# Patient Record
Sex: FEMALE | Race: WHITE | NOT HISPANIC OR LATINO
[De-identification: names, ages, dates, MRNs, and addresses within clinical notes are randomized per-mention and may not be internally consistent; named-entity substitution may affect disease eponyms.]

---

## 2023-08-15 ENCOUNTER — APPOINTMENT (OUTPATIENT)
Dept: OPHTHALMOLOGY | Facility: CLINIC | Age: 73
End: 2023-08-15
Payer: MEDICARE

## 2023-08-15 ENCOUNTER — NON-APPOINTMENT (OUTPATIENT)
Age: 73
End: 2023-08-15

## 2023-08-15 PROCEDURE — 92025 CPTRIZED CORNEAL TOPOGRAPHY: CPT

## 2023-08-15 PROCEDURE — 92136 OPHTHALMIC BIOMETRY: CPT

## 2023-08-15 PROCEDURE — 92004 COMPRE OPH EXAM NEW PT 1/>: CPT

## 2023-08-15 PROCEDURE — 92250 FUNDUS PHOTOGRAPHY W/I&R: CPT

## 2023-08-29 ENCOUNTER — TRANSCRIPTION ENCOUNTER (OUTPATIENT)
Age: 73
End: 2023-08-29

## 2024-01-19 RX ORDER — ACETAMINOPHEN 500 MG
650 TABLET ORAL EVERY 6 HOURS
Refills: 0 | Status: DISCONTINUED | OUTPATIENT
Start: 2024-01-22 | End: 2024-01-22

## 2024-01-19 RX ORDER — SODIUM CHLORIDE 9 MG/ML
1000 INJECTION, SOLUTION INTRAVENOUS
Refills: 0 | Status: DISCONTINUED | OUTPATIENT
Start: 2024-01-22 | End: 2024-01-22

## 2024-01-19 NOTE — ASU PATIENT PROFILE, ADULT - NSICDXPASTSURGICALHX_GEN_ALL_CORE_FT
PAST SURGICAL HISTORY:  No significant past surgical history PAST SURGICAL HISTORY:  H/O  section X1    H/O knee surgery right knee meniscus repair    History of surgery cardiac ablation twice ,      History of surgery right hand fracture repair

## 2024-01-19 NOTE — ASU PATIENT PROFILE, ADULT - NS PREOP UNDERSTANDS INFO
Spoke to patient to be Npo/NO solid foods after 2300 pm on Sunday night , allow to drink water till   2 - am , dress comfortable, leave all valuable at home , Bring ID photo and insurance cards,  escort arranged, address and telephone given to patient/yes

## 2024-01-19 NOTE — ASU PATIENT PROFILE, ADULT - NS PRO PASSIVE SMOKE EXP
Results   LIPID PNL   06 Sep 2017 07:19 AM  -   FASTING STATUS: 12 hrs  -   CHOLESTEROL: 163  Reference Range: <200  -   HDL CHOLESTEROL: 50  Reference Range: >39  -   TRIGLYCERIDES: 131  Reference Range: <150  -   LDL CHOLESTEROL (CALCULATED): 87  Reference Range: <130  -   NON-HDL CHOLESTEROL: 113 mg/dl  -   CHOLESTEROL/HDL RATIO: 3.3   Reference Range: <4.5.  Discussed   Looks good.   No

## 2024-01-19 NOTE — ASU PATIENT PROFILE, ADULT - FALL HARM RISK - HARM RISK INTERVENTIONS

## 2024-01-19 NOTE — ASU PATIENT PROFILE, ADULT - NSICDXPASTMEDICALHX_GEN_ALL_CORE_FT
PAST MEDICAL HISTORY:  Atrial fibrillation     GERD (gastroesophageal reflux disease)     Hypertension      PAST MEDICAL HISTORY:  Atrial fibrillation     GERD (gastroesophageal reflux disease)

## 2024-01-22 ENCOUNTER — APPOINTMENT (OUTPATIENT)
Dept: OPHTHALMOLOGY | Facility: AMBULATORY SURGERY CENTER | Age: 74
End: 2024-01-22

## 2024-01-22 ENCOUNTER — TRANSCRIPTION ENCOUNTER (OUTPATIENT)
Age: 74
End: 2024-01-22

## 2024-01-22 ENCOUNTER — OUTPATIENT (OUTPATIENT)
Dept: OUTPATIENT SERVICES | Facility: HOSPITAL | Age: 74
LOS: 1 days | Discharge: HOME | End: 2024-01-22
Payer: MEDICARE

## 2024-01-22 VITALS
DIASTOLIC BLOOD PRESSURE: 56 MMHG | TEMPERATURE: 98 F | WEIGHT: 129.63 LBS | RESPIRATION RATE: 16 BRPM | OXYGEN SATURATION: 96 % | HEART RATE: 72 BPM | SYSTOLIC BLOOD PRESSURE: 115 MMHG | HEIGHT: 65 IN

## 2024-01-22 VITALS
RESPIRATION RATE: 16 BRPM | DIASTOLIC BLOOD PRESSURE: 46 MMHG | SYSTOLIC BLOOD PRESSURE: 100 MMHG | HEART RATE: 73 BPM | OXYGEN SATURATION: 98 % | TEMPERATURE: 99 F

## 2024-01-22 DIAGNOSIS — Z98.890 OTHER SPECIFIED POSTPROCEDURAL STATES: Chronic | ICD-10-CM

## 2024-01-22 DIAGNOSIS — Z98.891 HISTORY OF UTERINE SCAR FROM PREVIOUS SURGERY: Chronic | ICD-10-CM

## 2024-01-22 PROCEDURE — 6698F: CPT | Mod: LT

## 2024-01-22 PROCEDURE — 66984 XCAPSL CTRC RMVL W/O ECP: CPT | Mod: LT

## 2024-01-22 DEVICE — LENS IOL CLAREON CCA0T0 23D
Type: IMPLANTABLE DEVICE | Site: LEFT | Status: NON-FUNCTIONAL
Removed: 2024-01-22

## 2024-01-22 RX ORDER — OFLOXACIN 0.3 %
1 DROPS OPHTHALMIC (EYE)
Refills: 0 | Status: COMPLETED | OUTPATIENT
Start: 2024-01-22 | End: 2024-01-22

## 2024-01-22 RX ORDER — FAMOTIDINE 10 MG/ML
1 INJECTION INTRAVENOUS
Refills: 0 | DISCHARGE

## 2024-01-22 RX ORDER — PHENYLEPHRINE HCL 2.5 %
1 DROPS OPHTHALMIC (EYE)
Refills: 0 | Status: COMPLETED | OUTPATIENT
Start: 2024-01-22 | End: 2024-01-22

## 2024-01-22 RX ORDER — METOPROLOL TARTRATE 50 MG
1 TABLET ORAL
Refills: 0 | DISCHARGE

## 2024-01-22 RX ORDER — TROPICAMIDE 1 %
1 DROPS OPHTHALMIC (EYE)
Refills: 0 | Status: COMPLETED | OUTPATIENT
Start: 2024-01-22 | End: 2024-01-22

## 2024-01-22 RX ORDER — APIXABAN 2.5 MG/1
1 TABLET, FILM COATED ORAL
Refills: 0 | DISCHARGE

## 2024-01-22 RX ADMIN — Medication 1 DROP(S): at 09:46

## 2024-01-22 RX ADMIN — Medication 1 DROP(S): at 09:51

## 2024-01-22 RX ADMIN — Medication 1 DROP(S): at 09:56

## 2024-01-22 NOTE — OPERATIVE REPORT - OPERATIVE RPOSRT DETAILS
SURGEON: Ja Poole MD    ASSISTANT: Alma Contreras MD    PREOPERATIVE DIAGNOSIS:  Cataract, Astigmatism-Left eye    POSTOPERATIVE DIAGNOSIS:  Same- Left eye    OPERATIVE PROCEDURE:  Femtosecond laser assisted cataract surgery (FLACS) with insertion of posterior chamber intraocular lens, Left eye    LENS USED: CCA0T0 +23.0D    PROCEDURE:  The patient was brought into the laser room. After installation of topical anesthetic the femtosecond laser was used for select steps of the cataract procedure.   The patient was brought into the operating room and placed supine on the operating room table. After uneventful induction of neuroleptic anesthesia, additional lidocaine gel was instilled into the operative eye achieving sufficient anesthesia. The patient was then prepped and draped in the usual sterile fashion. A wire lid speculum was then inserted into the operative eye giving a wide palpebral fissure.    A joshua knife was used to perform a paracentesis through clear cornea at the 5 o'clock limbal position. The anterior chamber was irrigated with lidocaine 1% nonpreserved. When available preservative free epinephrine was also placed intracamerally for additional pupil dilation.  Viscoelastic was then used to maintain the anterior chamber. A keratome was used to create a clear corneal incision just inside the temporal limbus. An Utrata forceps was used to complete the anterior capsulorrhexis and the freed capsule was removed from the eye. Balanced salt solution was used to hydrodissect the nucleus. The phacoemulsification unit was then used to completely phacoemulsify the nucleus, following which an aspirating hand piece was used to aspirate all cortical remnants from the capsular bag. Viscoelastic was again used to reform the anterior chamber and capsular bag.    A new foldable posterior chamber intraocular lens was brought into the surgical field. It was folded and directly injected into the capsular bag following which it was centered and secure. All viscoelastic was then aspirated from the anterior segment using an aspirating hand piece. All wounds were hydrated and found to be watertight.  The wounds remained watertight. The lid speculum was removed from the eye and a shield placed over the eye.  An antibiotic/steroid mixture was irrigated into the conjunctival cul de sac. The patient tolerated the procedure well and went to the recovery area in good condition.

## 2024-01-22 NOTE — ASU DISCHARGE PLAN (ADULT/PEDIATRIC) - NS MD DC FALL RISK RISK
For information on Fall & Injury Prevention, visit: [email or web address] OR  [email or web address] OR  https://www.cdc.gov/steadi/patient.html

## 2024-01-22 NOTE — PRE-ANESTHESIA EVALUATION ADULT - NS MD HP INPLANTS MED DEV
Called, spoke to pt. Two identifiers confirmed. Notified pt after speaking with Dr. Darron Williamson to go to [facility]. Pt verbalized understanding of information discussed w/ no further questions at this time. None

## 2024-01-23 ENCOUNTER — NON-APPOINTMENT (OUTPATIENT)
Age: 74
End: 2024-01-23

## 2024-01-23 ENCOUNTER — APPOINTMENT (OUTPATIENT)
Dept: OPHTHALMOLOGY | Facility: CLINIC | Age: 74
End: 2024-01-23
Payer: MEDICARE

## 2024-01-23 PROCEDURE — 99024 POSTOP FOLLOW-UP VISIT: CPT

## 2024-01-26 PROBLEM — K21.9 GASTRO-ESOPHAGEAL REFLUX DISEASE WITHOUT ESOPHAGITIS: Chronic | Status: ACTIVE | Noted: 2024-01-19

## 2024-01-26 PROBLEM — I48.91 UNSPECIFIED ATRIAL FIBRILLATION: Chronic | Status: ACTIVE | Noted: 2024-01-19

## 2024-02-01 ENCOUNTER — NON-APPOINTMENT (OUTPATIENT)
Age: 74
End: 2024-02-01

## 2024-02-01 ENCOUNTER — APPOINTMENT (OUTPATIENT)
Dept: OPHTHALMOLOGY | Facility: CLINIC | Age: 74
End: 2024-02-01
Payer: MEDICARE

## 2024-02-01 PROCEDURE — 99024 POSTOP FOLLOW-UP VISIT: CPT

## 2024-02-01 RX ORDER — SODIUM CHLORIDE 9 MG/ML
1000 INJECTION, SOLUTION INTRAVENOUS
Refills: 0 | Status: DISCONTINUED | OUTPATIENT
Start: 2024-02-05 | End: 2024-02-05

## 2024-02-02 NOTE — ASU PATIENT PROFILE, ADULT - FALL HARM RISK - HARM RISK INTERVENTIONS

## 2024-02-02 NOTE — ASU PATIENT PROFILE, ADULT - NS PREOP UNDERSTANDS INFO
No solid food/dairy/candy/gum after 11:30pm Sunday; water allowed before 04:30am Monday; patient reminded to come with photo ID/insurance/credit card; dress in comfortable clothes; no jewelries/contact lens/valuable; no smoking/alcohol drinking/recreational drug use Sunday; escort to have photo ID; address and callback number was given;/yes

## 2024-02-02 NOTE — ASU PATIENT PROFILE, ADULT - NSICDXPASTSURGICALHX_GEN_ALL_CORE_FT
PAST SURGICAL HISTORY:  H/O  section X1    H/O knee surgery right knee meniscus repair    History of surgery cardiac ablation twice ,      History of surgery right hand fracture repair    S/P cataract extraction left

## 2024-02-05 ENCOUNTER — TRANSCRIPTION ENCOUNTER (OUTPATIENT)
Age: 74
End: 2024-02-05

## 2024-02-05 ENCOUNTER — APPOINTMENT (OUTPATIENT)
Dept: OPHTHALMOLOGY | Facility: AMBULATORY SURGERY CENTER | Age: 74
End: 2024-02-05

## 2024-02-05 ENCOUNTER — OUTPATIENT (OUTPATIENT)
Dept: OUTPATIENT SERVICES | Facility: HOSPITAL | Age: 74
LOS: 1 days | Discharge: HOME | End: 2024-02-05
Payer: MEDICARE

## 2024-02-05 VITALS
SYSTOLIC BLOOD PRESSURE: 104 MMHG | TEMPERATURE: 99 F | DIASTOLIC BLOOD PRESSURE: 54 MMHG | HEART RATE: 81 BPM | OXYGEN SATURATION: 98 % | RESPIRATION RATE: 16 BRPM

## 2024-02-05 VITALS
TEMPERATURE: 98 F | WEIGHT: 128.09 LBS | DIASTOLIC BLOOD PRESSURE: 61 MMHG | SYSTOLIC BLOOD PRESSURE: 114 MMHG | RESPIRATION RATE: 16 BRPM | HEART RATE: 69 BPM | HEIGHT: 65 IN | OXYGEN SATURATION: 96 %

## 2024-02-05 DIAGNOSIS — Z98.890 OTHER SPECIFIED POSTPROCEDURAL STATES: Chronic | ICD-10-CM

## 2024-02-05 DIAGNOSIS — Z98.49 CATARACT EXTRACTION STATUS, UNSPECIFIED EYE: Chronic | ICD-10-CM

## 2024-02-05 DIAGNOSIS — Z98.891 HISTORY OF UTERINE SCAR FROM PREVIOUS SURGERY: Chronic | ICD-10-CM

## 2024-02-05 PROCEDURE — 6698F: CPT | Mod: RT,79

## 2024-02-05 PROCEDURE — 66984 XCAPSL CTRC RMVL W/O ECP: CPT | Mod: RT,79

## 2024-02-05 DEVICE — LENS IOL CLAREON CCA0T0 22.5D
Type: IMPLANTABLE DEVICE | Site: RIGHT | Status: NON-FUNCTIONAL
Removed: 2024-02-05

## 2024-02-05 RX ORDER — TROPICAMIDE 1 %
1 DROPS OPHTHALMIC (EYE)
Refills: 0 | Status: COMPLETED | OUTPATIENT
Start: 2024-02-05 | End: 2024-02-05

## 2024-02-05 RX ORDER — ACETAMINOPHEN 500 MG
650 TABLET ORAL ONCE
Refills: 0 | Status: DISCONTINUED | OUTPATIENT
Start: 2024-02-05 | End: 2024-02-05

## 2024-02-05 RX ORDER — PHENYLEPHRINE HCL 2.5 %
1 DROPS OPHTHALMIC (EYE)
Refills: 0 | Status: COMPLETED | OUTPATIENT
Start: 2024-02-05 | End: 2024-02-05

## 2024-02-05 RX ORDER — OFLOXACIN 0.3 %
1 DROPS OPHTHALMIC (EYE)
Refills: 0 | Status: COMPLETED | OUTPATIENT
Start: 2024-02-05 | End: 2024-02-05

## 2024-02-05 RX ADMIN — Medication 1 DROP(S): at 07:06

## 2024-02-05 RX ADMIN — Medication 1 DROP(S): at 07:01

## 2024-02-05 RX ADMIN — Medication 1 DROP(S): at 07:11

## 2024-02-05 NOTE — OPERATIVE REPORT - OPERATIVE RPOSRT DETAILS
SURGEON: Ja Poole MD    ASSISTANT: Alma Contreras MD    PREOPERATIVE DIAGNOSIS:  Cataract, Astigmatism-Right eye    POSTOPERATIVE DIAGNOSIS:  Same, Right eye    OPERATIVE PROCEDURE:  Femtosecond laser assisted cataract surgery (FLACS) with insertion of posterior chamber intraocular lens, Right eye    LENS USED: CCA0T0 +22.5D    PROCEDURE:  The patient was brought into the laser room. After installation of topical anesthetic the femtosecond laser was used for select steps of the cataract procedure.   The patient was brought into the operating room and placed supine on the operating room table. After uneventful induction of neuroleptic anesthesia, additional lidocaine gel was instilled into the operative eye achieving sufficient anesthesia. The patient was then prepped and draped in the usual sterile fashion. A wire lid speculum was then inserted into the operative eye giving a wide palpebral fissure.    A joshua knife was used to perform a paracentesis through clear cornea at the 10 o'clock limbal position. The anterior chamber was irrigated with lidocaine 1% nonpreserved. When available preservative free epinephrine was also placed intracamerally for additional pupil dilation.  Viscoelastic was then used to maintain the anterior chamber. A keratome was used to create a clear corneal incision just inside the temporal limbus. An Utrata forceps was used to complete the anterior capsulorrhexis and the freed capsule was removed from the eye. Balanced salt solution was used to hydrodissect the nucleus. The phacoemulsification unit was then used to completely phacoemulsify the nucleus, following which an aspirating hand piece was used to aspirate all cortical remnants from the capsular bag. Viscoelastic was again used to reform the anterior chamber and capsular bag.    A new foldable posterior chamber intraocular lens was brought into the surgical field. It was folded and directly injected into the capsular bag following which it was centered and secure. All viscoelastic was then aspirated from the anterior segment using an aspirating hand piece. All wounds were hydrated and found to be watertight.  The wounds remained watertight. The lid speculum was removed from the eye and a shield placed over the eye.  An antibiotic/steroid mixture was irrigated into the conjunctival cul de sac. The patient tolerated the procedure well and went to the recovery area in good condition.

## 2024-02-05 NOTE — ASU DISCHARGE PLAN (ADULT/PEDIATRIC) - NS MD DC FALL RISK RISK
For information on Fall & Injury Prevention, visit: [email or web address] OR  [email or web address] OR  https://www.cdc.gov/steadi/patient.html

## 2024-02-06 ENCOUNTER — NON-APPOINTMENT (OUTPATIENT)
Age: 74
End: 2024-02-06

## 2024-02-06 ENCOUNTER — APPOINTMENT (OUTPATIENT)
Dept: OPHTHALMOLOGY | Facility: CLINIC | Age: 74
End: 2024-02-06
Payer: MEDICARE

## 2024-02-06 PROCEDURE — 99024 POSTOP FOLLOW-UP VISIT: CPT

## 2024-02-15 ENCOUNTER — APPOINTMENT (OUTPATIENT)
Dept: OPHTHALMOLOGY | Facility: CLINIC | Age: 74
End: 2024-02-15
Payer: MEDICARE

## 2024-02-15 ENCOUNTER — NON-APPOINTMENT (OUTPATIENT)
Age: 74
End: 2024-02-15

## 2024-02-15 PROCEDURE — 99024 POSTOP FOLLOW-UP VISIT: CPT

## 2024-03-12 ENCOUNTER — APPOINTMENT (OUTPATIENT)
Dept: OPHTHALMOLOGY | Facility: CLINIC | Age: 74
End: 2024-03-12
Payer: MEDICARE

## 2024-03-12 ENCOUNTER — NON-APPOINTMENT (OUTPATIENT)
Age: 74
End: 2024-03-12

## 2024-03-12 PROCEDURE — 99024 POSTOP FOLLOW-UP VISIT: CPT

## 2024-03-28 NOTE — ASU PATIENT PROFILE, ADULT - TOBACCO USE
Spoke with patient.  She reports she was bit by a cat yesterday.  Pus formed overnight.  Reports she now has some redness and swelling.  Informed no appointments available in office.  Patient should be seen today in urgent care.  She is requesting antibiotics.  Informed Dr. No does not give antibiotics without seeing the patient but she is full today.  Again advised to be seen in urgent care today. She states she is not sure she will be able to go.    
[FreeTextEntry1] : 39 year old female  with diagnosis of upper respiratory infection. Patient was prescribed antibiotics, however was advised to hold and monitor symptoms.  If symptoms progress or worsen, patient was instructed to start taking the medications.  Once starting medications, patient was advised to finish entire course of antibiotics.  Patient was advised to drink plenty of fluids, rest and to call office or go to the ER immediately if any worsening of symptoms occur.\par 
Never smoker

## 2024-04-04 NOTE — PRE-ANESTHESIA EVALUATION ADULT - NS MD HP INPLANTS MED DEV
FAMILY HISTORY:  Mother  Still living? Unknown  Family history of CVA, Age at diagnosis: Age Unknown     None

## 2025-05-23 NOTE — PACU DISCHARGE NOTE - NAUSEA/VOMITING:
The patient's goals for the shift include  manage pain and maintain patient safety    The clinical goals for the shift include maintain safety    Problem: Pain - Adult  Goal: Verbalizes/displays adequate comfort level or baseline comfort level  Outcome: Progressing     Problem: Safety - Adult  Goal: Free from fall injury  Outcome: Progressing     Problem: Discharge Planning  Goal: Discharge to home or other facility with appropriate resources  Outcome: Progressing     Problem: Chronic Conditions and Co-morbidities  Goal: Patient's chronic conditions and co-morbidity symptoms are monitored and maintained or improved  Outcome: Progressing     Problem: Nutrition  Goal: Nutrient intake appropriate for maintaining nutritional needs  Outcome: Progressing     Problem: Pain  Goal: Takes deep breaths with improved pain control throughout the shift  Outcome: Progressing  Goal: Turns in bed with improved pain control throughout the shift  Outcome: Progressing  Goal: Walks with improved pain control throughout the shift  Outcome: Progressing  Goal: Performs ADL's with improved pain control throughout shift  Outcome: Progressing  Goal: Participates in PT with improved pain control throughout the shift  Outcome: Progressing  Goal: Free from opioid side effects throughout the shift  Outcome: Progressing  Goal: Free from acute confusion related to pain meds throughout the shift  Outcome: Progressing      None

## (undated) DEVICE — SOL IRR BAG BSS 500ML

## (undated) DEVICE — CAPSULE RETRACTOR MST

## (undated) DEVICE — PACK ANTERIOR SEGMENT

## (undated) DEVICE — SOL IRR BAL SALT 500ML

## (undated) DEVICE — DRAPE MICROSCOPE KNOB COVER SMALL (2 PCS)

## (undated) DEVICE — GOWN ROYAL SILK XL

## (undated) DEVICE — CANNULA IRR ANT CHAMBER 30G

## (undated) DEVICE — GLV 7.5 PROTEXIS (WHITE)

## (undated) DEVICE — SUT NYLON 10-0 12" CU-5

## (undated) DEVICE — KNIFE ALCON PARACENTESIS CLEARCUT SIDEPORT 1MM (YELLOW)

## (undated) DEVICE — KNIFE ALCON PARACENTESIS CLEARCUT SIDEPORT 1.2MM (YELLOW)

## (undated) DEVICE — PACK CENTURION 2.4MM

## (undated) DEVICE — SOL BALANCE SALT 15ML